# Patient Record
Sex: FEMALE | Race: WHITE | URBAN - METROPOLITAN AREA
[De-identification: names, ages, dates, MRNs, and addresses within clinical notes are randomized per-mention and may not be internally consistent; named-entity substitution may affect disease eponyms.]

---

## 2017-01-20 ENCOUNTER — COMMUNICATION - HEALTHEAST (OUTPATIENT)
Dept: ADMINISTRATIVE | Facility: CLINIC | Age: 42
End: 2017-01-20

## 2017-01-27 ENCOUNTER — COMMUNICATION - HEALTHEAST (OUTPATIENT)
Dept: ADMINISTRATIVE | Facility: CLINIC | Age: 42
End: 2017-01-27

## 2017-01-30 ENCOUNTER — COMMUNICATION - HEALTHEAST (OUTPATIENT)
Dept: ADMINISTRATIVE | Facility: CLINIC | Age: 42
End: 2017-01-30

## 2017-02-05 ENCOUNTER — HOSPITAL ENCOUNTER (EMERGENCY)
Facility: CLINIC | Age: 42
Discharge: ANOTHER HOSPITAL | End: 2017-02-05
Attending: EMERGENCY MEDICINE | Admitting: EMERGENCY MEDICINE
Payer: MEDICARE

## 2017-02-05 ENCOUNTER — RECORDS - HEALTHEAST (OUTPATIENT)
Dept: ADMINISTRATIVE | Facility: OTHER | Age: 42
End: 2017-02-05

## 2017-02-05 ENCOUNTER — HOSPITAL ENCOUNTER (INPATIENT)
Facility: CLINIC | Age: 42
LOS: 4 days | Discharge: HOME | DRG: 885 | End: 2017-02-09
Attending: PSYCHIATRY & NEUROLOGY | Admitting: PSYCHIATRY & NEUROLOGY
Payer: MEDICARE

## 2017-02-05 VITALS
DIASTOLIC BLOOD PRESSURE: 87 MMHG | OXYGEN SATURATION: 100 % | TEMPERATURE: 97.9 F | RESPIRATION RATE: 16 BRPM | SYSTOLIC BLOOD PRESSURE: 121 MMHG

## 2017-02-05 DIAGNOSIS — F31.2: ICD-10-CM

## 2017-02-05 DIAGNOSIS — F31.9 BIPOLAR 1 DISORDER (H): ICD-10-CM

## 2017-02-05 DIAGNOSIS — F31.9 BIPOLAR 1 DISORDER (H): Primary | ICD-10-CM

## 2017-02-05 LAB
ALBUMIN SERPL-MCNC: 3.9 G/DL (ref 3.4–5)
ALBUMIN UR-MCNC: NEGATIVE MG/DL
ALP SERPL-CCNC: 37 U/L (ref 40–150)
ALT SERPL W P-5'-P-CCNC: 18 U/L (ref 0–50)
AMPHETAMINES UR QL SCN: NORMAL
ANION GAP SERPL CALCULATED.3IONS-SCNC: 6 MMOL/L (ref 3–14)
APPEARANCE UR: ABNORMAL
AST SERPL W P-5'-P-CCNC: 12 U/L (ref 0–45)
BARBITURATES UR QL: NORMAL
BASOPHILS # BLD AUTO: 0 10E9/L (ref 0–0.2)
BASOPHILS NFR BLD AUTO: 0.7 %
BENZODIAZ UR QL: NORMAL
BILIRUB SERPL-MCNC: 0.4 MG/DL (ref 0.2–1.3)
BILIRUB UR QL STRIP: NEGATIVE
BUN SERPL-MCNC: 11 MG/DL (ref 7–30)
CALCIUM SERPL-MCNC: 10.1 MG/DL (ref 8.5–10.1)
CANNABINOIDS UR QL SCN: NORMAL
CHLORIDE SERPL-SCNC: 106 MMOL/L (ref 94–109)
CO2 SERPL-SCNC: 28 MMOL/L (ref 20–32)
COCAINE UR QL: NORMAL
COLOR UR AUTO: ABNORMAL
CREAT SERPL-MCNC: 0.83 MG/DL (ref 0.52–1.04)
DIFFERENTIAL METHOD BLD: ABNORMAL
EOSINOPHIL # BLD AUTO: 0.2 10E9/L (ref 0–0.7)
EOSINOPHIL NFR BLD AUTO: 3.1 %
ERYTHROCYTE [DISTWIDTH] IN BLOOD BY AUTOMATED COUNT: 12 % (ref 10–15)
GFR SERPL CREATININE-BSD FRML MDRD: 75 ML/MIN/1.7M2
GLUCOSE SERPL-MCNC: 91 MG/DL (ref 70–99)
GLUCOSE UR STRIP-MCNC: NEGATIVE MG/DL
HCG UR QL: NEGATIVE
HCT VFR BLD AUTO: 42.1 % (ref 35–47)
HGB BLD-MCNC: 14.1 G/DL (ref 11.7–15.7)
HGB UR QL STRIP: NEGATIVE
IMM GRANULOCYTES # BLD: 0 10E9/L (ref 0–0.4)
IMM GRANULOCYTES NFR BLD: 0.2 %
KETONES UR STRIP-MCNC: NEGATIVE MG/DL
LEUKOCYTE ESTERASE UR QL STRIP: NEGATIVE
LIPASE SERPL-CCNC: 295 U/L (ref 73–393)
LITHIUM SERPL-SCNC: 0.5 MMOL/L (ref 0.6–1.2)
LYMPHOCYTES # BLD AUTO: 1.6 10E9/L (ref 0.8–5.3)
LYMPHOCYTES NFR BLD AUTO: 26.3 %
MCH RBC QN AUTO: 31 PG (ref 26.5–33)
MCHC RBC AUTO-ENTMCNC: 33.5 G/DL (ref 31.5–36.5)
MCV RBC AUTO: 93 FL (ref 78–100)
MONOCYTES # BLD AUTO: 0.8 10E9/L (ref 0–1.3)
MONOCYTES NFR BLD AUTO: 12.7 %
MUCOUS THREADS #/AREA URNS LPF: PRESENT /LPF
NEUTROPHILS # BLD AUTO: 3.5 10E9/L (ref 1.6–8.3)
NEUTROPHILS NFR BLD AUTO: 57 %
NITRATE UR QL: NEGATIVE
OPIATES UR QL SCN: NORMAL
PCP UR QL SCN: NORMAL
PH UR STRIP: 7.5 PH (ref 5–7)
PLATELET # BLD AUTO: 144 10E9/L (ref 150–450)
POTASSIUM SERPL-SCNC: 3.6 MMOL/L (ref 3.4–5.3)
PROT SERPL-MCNC: 7.6 G/DL (ref 6.8–8.8)
RBC # BLD AUTO: 4.55 10E12/L (ref 3.8–5.2)
RBC #/AREA URNS AUTO: 0 /HPF (ref 0–2)
SODIUM SERPL-SCNC: 140 MMOL/L (ref 133–144)
SP GR UR STRIP: 1.01 (ref 1–1.03)
SQUAMOUS #/AREA URNS AUTO: <1 /HPF (ref 0–1)
TSH SERPL DL<=0.005 MIU/L-ACNC: 1.1 MU/L (ref 0.4–4)
URN SPEC COLLECT METH UR: ABNORMAL
UROBILINOGEN UR STRIP-MCNC: NORMAL MG/DL (ref 0–2)
WBC # BLD AUTO: 6.1 10E9/L (ref 4–11)
WBC #/AREA URNS AUTO: 1 /HPF (ref 0–2)

## 2017-02-05 PROCEDURE — 83690 ASSAY OF LIPASE: CPT | Performed by: EMERGENCY MEDICINE

## 2017-02-05 PROCEDURE — 80053 COMPREHEN METABOLIC PANEL: CPT | Performed by: EMERGENCY MEDICINE

## 2017-02-05 PROCEDURE — 25000132 ZZH RX MED GY IP 250 OP 250 PS 637: Mod: GY | Performed by: PSYCHIATRY & NEUROLOGY

## 2017-02-05 PROCEDURE — 90791 PSYCH DIAGNOSTIC EVALUATION: CPT

## 2017-02-05 PROCEDURE — 80178 ASSAY OF LITHIUM: CPT | Performed by: EMERGENCY MEDICINE

## 2017-02-05 PROCEDURE — A9270 NON-COVERED ITEM OR SERVICE: HCPCS | Mod: GY | Performed by: PSYCHIATRY & NEUROLOGY

## 2017-02-05 PROCEDURE — 36415 COLL VENOUS BLD VENIPUNCTURE: CPT | Performed by: PSYCHIATRY & NEUROLOGY

## 2017-02-05 PROCEDURE — 80307 DRUG TEST PRSMV CHEM ANLYZR: CPT | Performed by: EMERGENCY MEDICINE

## 2017-02-05 PROCEDURE — 81025 URINE PREGNANCY TEST: CPT | Performed by: EMERGENCY MEDICINE

## 2017-02-05 PROCEDURE — 84443 ASSAY THYROID STIM HORMONE: CPT | Performed by: PSYCHIATRY & NEUROLOGY

## 2017-02-05 PROCEDURE — 85025 COMPLETE CBC W/AUTO DIFF WBC: CPT | Performed by: EMERGENCY MEDICINE

## 2017-02-05 PROCEDURE — 99284 EMERGENCY DEPT VISIT MOD MDM: CPT | Performed by: EMERGENCY MEDICINE

## 2017-02-05 PROCEDURE — 99284 EMERGENCY DEPT VISIT MOD MDM: CPT | Mod: 25

## 2017-02-05 PROCEDURE — 12400006 ZZH R&B MH INTERMEDIATE

## 2017-02-05 PROCEDURE — 81001 URINALYSIS AUTO W/SCOPE: CPT | Performed by: EMERGENCY MEDICINE

## 2017-02-05 RX ORDER — DESOGESTREL AND ETHINYL ESTRADIOL 0.15-0.03
1 KIT ORAL DAILY
Status: DISCONTINUED | OUTPATIENT
Start: 2017-02-06 | End: 2017-02-09 | Stop reason: HOSPADM

## 2017-02-05 RX ORDER — DESOGESTREL AND ETHINYL ESTRADIOL 0.15-0.03
1 KIT ORAL DAILY
COMMUNITY

## 2017-02-05 RX ORDER — LITHIUM CARBONATE 300 MG/1
300 CAPSULE ORAL
Status: DISCONTINUED | OUTPATIENT
Start: 2017-02-05 | End: 2017-02-07

## 2017-02-05 RX ORDER — ARIPIPRAZOLE 30 MG/1
30 TABLET ORAL EVERY MORNING
Status: ON HOLD | COMMUNITY
End: 2017-02-08

## 2017-02-05 RX ORDER — QUETIAPINE FUMARATE 25 MG/1
25-50 TABLET, FILM COATED ORAL EVERY 6 HOURS PRN
Status: DISCONTINUED | OUTPATIENT
Start: 2017-02-05 | End: 2017-02-09 | Stop reason: HOSPADM

## 2017-02-05 RX ORDER — ARIPIPRAZOLE 15 MG/1
30 TABLET ORAL EVERY MORNING
Status: DISCONTINUED | OUTPATIENT
Start: 2017-02-06 | End: 2017-02-06

## 2017-02-05 RX ADMIN — LITHIUM CARBONATE 300 MG: 300 CAPSULE, GELATIN COATED ORAL at 18:07

## 2017-02-05 RX ADMIN — AMITRIPTYLINE HYDROCHLORIDE 25 MG: 25 TABLET, FILM COATED ORAL at 21:56

## 2017-02-05 ASSESSMENT — ENCOUNTER SYMPTOMS
NERVOUS/ANXIOUS: 1
SLEEP DISTURBANCE: 1
HALLUCINATIONS: 0

## 2017-02-05 ASSESSMENT — ACTIVITIES OF DAILY LIVING (ADL)
ORAL_HYGIENE: INDEPENDENT
LAUNDRY: WITH SUPERVISION
DRESS: SCRUBS (BEHAVIORAL HEALTH)
GROOMING: INDEPENDENT

## 2017-02-05 NOTE — ED NOTES
Report called to Martita @ [facility]. Waiting to hear from MD if EMS transfer or private car preferred. Pt aware of transfer. No questions asked at time of report by RN or pt

## 2017-02-05 NOTE — ED NOTES
Pt feeling stressed the past week.  Having increase in anxiety and manic symptoms.  Impulsive thinking, in ETOH recovery, doing things she shouldn't be doing.  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  Thinking about alcohol.  Pt taking her scheduled meds.  Did want to take med to help her sleep.  Brought meds with her. Pt feeling hopeless and depressed.  Worried about managing her job, feeling this way.  Denies feeling suicidal at this time.

## 2017-02-05 NOTE — ED NOTES
"Over last week patient has been experiencing insomnia (up 3 days straight), \"manic behaviors\": impulsive gambling, racing thoughts, and now per pt depression. States she has bipolar hx but only has experienced cristina, never a \"low/depressed\" feeling. Called psychologist who recommended pt come in if no relief in sx after last visit and med change. Pt making eye contact, vitals stable. States she has been sober x2 years, has had thoughts of getting a hotel room alcohol, but no plan for death or suicide- states \"I just want to run away and get some sleep\"   "

## 2017-02-05 NOTE — ED PROVIDER NOTES
History     Chief Complaint   Patient presents with     Manic Behavior     Pt feeling stressed the past week.  Having increase in anxiety and manic symptoms.  Impulsive thinking, in ETOH recovery, doing things she shouldn't be doing.  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  Thinking about alcohol.  Pt taking her scheduled meds.  Did want to take med to help her sleep.  Brought meds with her.      GELA Gale is a 41 year old female with a history of bipolar disorder presents to the Emergency Department for evaluation of mood swings. The patient reports over the last 1-2 weeks, she has been feeling very manic. She admits to impulsive and risky behavior like spending money. She notes although she has been sober for 2 years, she has been thinking about alcohol and wants to get a hotel room and get drunk. She notes during this time, she was away for three days straight. She has high levels of stress as she is working and going to college full time. In the last two days, the patient reports feelings of extreme depression. She reports feelings of failure, that she should quit school and her job, and hopelessness.     The patient was seen by her psychiatrist who increased her Abilify started the patient on Lithium about 1 week ago. She was told to go to the Emergency Department if her symptoms did not improve. She has had difficulty sleeping. The patient denies hallucinations and thoughts of self harm. She denies alcohol or drug use. The patient reports no recent illness which may have triggered her symptoms.     Patient Active Problem List   Diagnosis     TBI (traumatic brain injury) (H)     Current Outpatient Prescriptions   Medication Sig Dispense Refill     lithium 300 MG tablet Take 1 tablet (300 mg) by mouth 2 times daily 60 tablet 0     ARIPiprazole (ABILIFY) 5 MG tablet Take 1 tablet (5 mg) by mouth daily In addition to your 20 mg tab for a total of 25 mg daily 30 tablet 0     amitriptyline (ELAVIL) 25 MG  tablet Take 1 tablet (25 mg) by mouth At Bedtime 30 tablet 0     No Known Allergies    I have reviewed the Medications, Allergies, Past Medical and Surgical History, and Social History in the Epic system.    Review of Systems   Constitutional: Negative for activity change and appetite change.   HENT: Negative for congestion.    Respiratory: Negative for chest tightness and shortness of breath.    Cardiovascular: Negative for chest pain.   Gastrointestinal: Negative for nausea, vomiting and abdominal pain.   Genitourinary: Negative for dysuria and decreased urine volume.   Musculoskeletal: Negative for back pain and neck pain.   Skin: Negative for rash.   Neurological: Positive for headaches. Negative for weakness and numbness.   Psychiatric/Behavioral: Positive for behavioral problems and sleep disturbance. Negative for suicidal ideas, hallucinations and confusion. The patient is nervous/anxious.      Physical Exam   BP: 121/87 mmHg  Heart Rate: 6  Temp: 97.9  F (36.6  C)  Resp: 16  SpO2: 100 %  Physical Exam   Constitutional: She appears well-developed and well-nourished. No distress.   Nursing note and vitals reviewed.    HENT: Oral mucosa moist. No lesions.  Neck: Supple  Pulmonary/Chest: Lungs are clear to auscultation bilaterally.  Cardiovascular: Heart is regular rate and rhythm. No murmur.  Abdomen: Soft, non-distended, non-tender.   Musculoskeletal: Moving all extremities well. No peripheral edema.   Neurological: Alert. No focal neurologic deficit.   Skin: No rash.  Psych: Anxious. Firm grasp of the situation.     ED Course   Procedures             Critical Care time:  none              Labs Ordered and Resulted from Time of ED Arrival Up to the Time of Departure from the ED   CBC WITH PLATELETS DIFFERENTIAL - Abnormal; Notable for the following:     Platelet Count 144 (*)     All other components within normal limits   COMPREHENSIVE METABOLIC PANEL - Abnormal; Notable for the following:     Alkaline  Phosphatase 37 (*)     All other components within normal limits   URINE MACROSCOPIC WITH REFLEX TO MICRO - Abnormal; Notable for the following:     pH Urine 7.5 (*)     Mucous Urine Present (*)     All other components within normal limits   LITHIUM LEVEL - Abnormal; Notable for the following:     Lithium Level 0.5 (*)     All other components within normal limits   DRUG ABUSE SCREEN 77 URINE (FL, RH, SH)   LIPASE   HCG QUALITATIVE URINE     Results for orders placed or performed during the hospital encounter of 02/05/17 (from the past 24 hour(s))   Drug Screen Urine   Result Value Ref Range    Amphetamine Qual Urine  NEG     Negative   Cutoff for a negative amphetamine is 500 ng/mL or less.      Barbiturates Qual Urine  NEG     Negative   Cutoff for a negative barbiturate is 200 ng/mL or less.      Benzodiazepine Qual Urine  NEG     Negative   Cutoff for a negative benzodiazepine is 200 ng/mL or less.      Cannabinoids Qual Urine  NEG     Negative   Cutoff for a negative cannabinoid is 50 ng/mL or less.      Cocaine Qual Urine  NEG     Negative   Cutoff for a negative cocaine is 300 ng/mL or less.      Opiates Qualitative Urine  NEG     Negative   Cutoff for a negative opiate is 300 ng/mL or less.      PCP Qual Urine  NEG     Negative   Cutoff for a negative PCP is 25 ng/mL or less.         Medications - No data to display    11:48 AM Patient assessed.     Assessments & Plan (with Medical Decision Making)labs and a DEC consult were obtained. Patients white count was not elevated. There was no L shift. Comprehensive metabolic panel was unremarkable. Lithium level was low. Drug screen was unremarkable. DEC consultant felt patient needed to be admitted due to her worsening cristina and possible harm to herself. I discussed this with the patient and she was in agreement with the admission. All questions were answered to the best of my ability. I did place her on a transportation hold. She was transported to [facility] in  Stable condition.     I have reviewed the nursing notes.    I have reviewed the findings, diagnosis, plan and need for follow up with the patient.    New Prescriptions    No medications on file       Final diagnoses:   Bipolar 1 disorder (H)   Manic disorder, recurrent episode, severe, with psychotic behavior (H)     This document serves as a record of the services and decisions personally performed and made by Harsha Phillips MD. It was created on his behalf by Mirian Guzmán, a trained medical scribe. The creation of this document is based the provider's statements to the medical scribe.  Mirian Guzmán 11:48 AM 2/5/2017    Provider:   The information in this document, created by the medical scribe for me, accurately reflects the services I personally performed and the decisions made by me. I have reviewed and approved this document for accuracy prior to leaving the patient care area.  Harsha Phillips MD 11:48 AM 2/5/2017 2/5/2017   [facility] EMERGENCY DEPARTMENT      Harsha Phillips MD  02/06/17 1135

## 2017-02-05 NOTE — IP AVS SNAPSHOT
[place] [address] [place] [address]    Phone:  [phone]                                       After Visit Summary   2/5/2017    Deb Gale    MRN: [id number]           After Visit Summary Signature Page     I have received my discharge instructions, and my questions have been answered. I have discussed any challenges I see with this plan with the nurse or doctor.    ..........................................................................................................................................  Patient/Patient Representative Signature      ..........................................................................................................................................  Patient Representative Print Name and Relationship to Patient    ..................................................               ................................................  Date                                            Time    ..........................................................................................................................................  Reviewed by Signature/Title    ...................................................              ..............................................  Date                                                            Time

## 2017-02-05 NOTE — PROGRESS NOTES
02/05/17 1644   Patient Belongings   Did you bring any home meds/supplements to the hospital?  Yes   Disposition of meds  Other (see comment)   Patient Belongings other (see comments)   Disposition of Belongings Pt's belongings were put in Pt's locker   Belongings Search Yes   Clothing Search Yes   Second Staff Nehemias   Black winter coat  Boots  Jeans  Belt  Sweater  2 X socks  Two ear rings  Lighter  Grey purse  Backpack   Panties  Dark grey long sleave shirt  Sweat pants  1 tums tablet  3 packs of cigerattes  [employer] insurance  [place] ID card  $16  Candy  Loose change  One josé luis cube  Sun glasses  Makeup  Cell phone   Lithium Carbonate 300(mg) bottle  Amitriptyline 25(mg)    Security envelope #[id number]   [facility]'s #01  [place] card  Visa #[id number]   justo #[id number]  Master card #[id number]      Admission_________________________________________Date/Time__________      Discharge_________________________________________Date/Time__________
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
## 2017-02-05 NOTE — IP AVS SNAPSHOT
MRN:[id number]                      After Visit Summary   2/5/2017    Deb Gale    MRN: [id number]           Thank you!     Thank you for choosing [facility] for your care. Our goal is always to provide you with excellent care.        Patient Information     Date Of Birth          1975        About your hospital stay     You were admitted on:  February 5, 2017 You last received care in the:  [facility]    You were discharged on:  February 9, 2017       Who to Call     For medical emergencies, please call 911.  For non-urgent questions about your medical care, please call your primary care provider or clinic, [phone]          Attending Provider     Provider    Rickey Esquivel MD [place], Olukayode Oladele, MD       Primary Care Provider Office Phone # Fax #    Jory Jose Buchanan [phone]       [place] [id number] [facility] [address]        Further instructions from your care team       Behavioral Discharge Planning and Instructions    Summary: Admitted to hospital with manic behavior; evaluation of mood swings.    Main Diagnosis: Bipolar 1 disorder; alcohol use disorder in remission.     Major Treatments, Procedures and Findings: psychiatric evaluation.    Symptoms to Report: increased confusion, losing more sleep or mood getting worse    Lifestyle Adjustment: Continue sober lifestyle with AA and NA support. Develop and follow safety plan. Follow up with therapy and medication management.    Psychiatry Follow-up:     Felisa Hernandez, therapist- appointments on Friday 2/10/17 at 10 am and Monday 2/13/17 at 11 am.  [facility] [address] [address]  [phone] fax: [phone]    SUZANNE Soria- appointment on Friday 2/24/17 at 4 pm.  [place] [address] [address] [place] [address]  [phone] fax: [phone]    Resources:   Crisis Intervention: 980.877.9010 or 530-974-8622  "(TTY: 608.904.8841).  Call anytime for help.  National [place] on Mental Illness (www.[place].khoa.org): 992.948.3002 or 010-857-9018.  Alcoholics Anonymous (www.alcoholics-anonymous.org): Check your phone book for your local chapter.  National Suicide Prevention Line (www.mentalhealthmn.org): 346-091-IPPA (5188)    General Medication Instructions:   See your medication sheet(s) for instructions.   Take all medicines as directed.  Make no changes unless your doctor suggests them.   Go to all your doctor visits.  Be sure to have all your required lab tests. This way, your medicines can be refilled on time.  Do not use any drugs not prescribed by your doctor.  Avoid alcohol.        Pending Results     No orders found from 2/4/2017 to 2/6/2017.            Statement of Approval     Ordered          02/09/17 0856  I have reviewed and agree with all the recommendations and orders detailed in this document.   EFFECTIVE NOW     Approved and electronically signed by:  Beck Lai MD             Admission Information        Provider Department Dept Phone    2/5/2017 Beck Lai MD [facility] [phone]      Your Vitals Were     Blood Pressure Pulse Temperature Respirations Height Weight    114/75 mmHg 79 98  F (36.7  C) (Oral) 16 1.753 m (5' 9\") 77.111 kg (170 lb)    BMI (Body Mass Index)                   25.09 kg/m2           MyChart Information     [email or web address] lets you send messages to your doctor, view your test results, renew your prescriptions, schedule appointments and more. To sign up, go to [email or web address] . Click on \"Log in\" on the left side of the screen, which will take you to the Welcome page. Then click on \"Sign up Now\" on the right side of the page.     You will be asked to enter the access code listed below, as well as some personal information. Please follow the directions to create your username and password.     Your access code is: [email or web address]  Expires: 5/6/2017  " 1:58 PM     Your access code will  in 90 days. If you need help or a new code, please call your [facility] clinic or [phone].        Care EveryWhere ID     This is your Care EveryWhere ID. This could be used by other organizations to access your [facility] medical records  [id number]           Review of your medicines      START taking        Dose / Directions    lithium 450 MG CR tablet   Commonly known as:  ESKALITH   Used for:  Bipolar 1 disorder (H)   Replaces:  lithium 300 MG tablet        Dose:  450 mg   Take 1 tablet (450 mg) by mouth 2 times daily   Quantity:  60 tablet   Refills:  0       lurasidone 40 MG Tabs tablet   Commonly known as:  LATUDA   Used for:  Bipolar 1 disorder (H)        Dose:  40 mg   Take 1 tablet (40 mg) by mouth daily (with dinner)   Quantity:  30 tablet   Refills:  0         CONTINUE these medicines which have NOT CHANGED        Dose / Directions    amitriptyline 25 MG tablet   Commonly known as:  ELAVIL        Dose:  25 mg   Take 1 tablet (25 mg) by mouth At Bedtime   Quantity:  30 tablet   Refills:  0       desogestrel-ethinyl estradiol 0.15-30 MG-MCG per tablet   Commonly known as:  APRI        Dose:  1 tablet   Take 1 tablet by mouth daily   Refills:  0       NICOTINE POLACRILEX MT        Dose:  4 mg   Take 4 mg by mouth every hour as needed for smoking cessation   Refills:  0         STOP taking     ABILIFY 30 MG tablet   Generic drug:  ARIPiprazole           lithium 300 MG tablet   Replaced by:  lithium 450 MG CR tablet                Where to get your medicines      These medications were sent to [facility] - [address]  [address] [facility] [address], [place] [address]     Phone:  [phone]    - lithium 450 MG CR tablet  - lurasidone 40 MG Tabs tablet             Protect others around you: Learn how to safely use, store and throw away your medicines at www.disposemymeds.org.             Medication List: This is a list of all your  medications and when to take them. Check marks below indicate your daily home schedule. Keep this list as a reference.      Medications           Morning Afternoon Evening Bedtime As Needed    amitriptyline 25 MG tablet   Commonly known as:  ELAVIL   Take 1 tablet (25 mg) by mouth At Bedtime   Last time this was given:  25 mg on 2/8/2017  8:52 PM                                desogestrel-ethinyl estradiol 0.15-30 MG-MCG per tablet   Commonly known as:  APRI   Take 1 tablet by mouth daily                                lithium 450 MG CR tablet   Commonly known as:  ESKALITH   Take 1 tablet (450 mg) by mouth 2 times daily   Last time this was given:  450 mg on 2/9/2017  8:45 AM                                lurasidone 40 MG Tabs tablet   Commonly known as:  LATUDA   Take 1 tablet (40 mg) by mouth daily (with dinner)   Last time this was given:  40 mg on 2/8/2017  5:24 PM                                NICOTINE POLACRILEX MT   Take 4 mg by mouth every hour as needed for smoking cessation   Last time this was given:  4 mg on 2/9/2017  6:50 AM

## 2017-02-06 ENCOUNTER — RECORDS - HEALTHEAST (OUTPATIENT)
Dept: ADMINISTRATIVE | Facility: OTHER | Age: 42
End: 2017-02-06

## 2017-02-06 PROCEDURE — 25000132 ZZH RX MED GY IP 250 OP 250 PS 637: Mod: GY | Performed by: PSYCHIATRY & NEUROLOGY

## 2017-02-06 PROCEDURE — 90853 GROUP PSYCHOTHERAPY: CPT

## 2017-02-06 PROCEDURE — 12400006 ZZH R&B MH INTERMEDIATE

## 2017-02-06 PROCEDURE — 97150 GROUP THERAPEUTIC PROCEDURES: CPT | Mod: GO

## 2017-02-06 PROCEDURE — A9270 NON-COVERED ITEM OR SERVICE: HCPCS | Mod: GY | Performed by: PSYCHIATRY & NEUROLOGY

## 2017-02-06 RX ORDER — LURASIDONE HYDROCHLORIDE 40 MG/1
40 TABLET, FILM COATED ORAL
Status: DISCONTINUED | OUTPATIENT
Start: 2017-02-06 | End: 2017-02-09 | Stop reason: HOSPADM

## 2017-02-06 RX ADMIN — NICOTINE POLACRILEX 4 MG: 2 GUM, CHEWING ORAL at 09:43

## 2017-02-06 RX ADMIN — AMITRIPTYLINE HYDROCHLORIDE 25 MG: 25 TABLET, FILM COATED ORAL at 21:10

## 2017-02-06 RX ADMIN — LITHIUM CARBONATE 300 MG: 300 CAPSULE, GELATIN COATED ORAL at 16:03

## 2017-02-06 RX ADMIN — LURASIDONE HYDROCHLORIDE 40 MG: 40 TABLET, FILM COATED ORAL at 17:29

## 2017-02-06 RX ADMIN — ARIPIPRAZOLE 30 MG: 15 TABLET ORAL at 08:37

## 2017-02-06 RX ADMIN — NICOTINE POLACRILEX 4 MG: 2 GUM, CHEWING ORAL at 14:36

## 2017-02-06 RX ADMIN — LITHIUM CARBONATE 300 MG: 300 CAPSULE, GELATIN COATED ORAL at 08:37

## 2017-02-06 ASSESSMENT — ENCOUNTER SYMPTOMS
NUMBNESS: 0
APPETITE CHANGE: 0
BACK PAIN: 0
NAUSEA: 0
CHEST TIGHTNESS: 0
SHORTNESS OF BREATH: 0
HEADACHES: 1
CONFUSION: 0
NECK PAIN: 0
DYSURIA: 0
ACTIVITY CHANGE: 0
ABDOMINAL PAIN: 0
WEAKNESS: 0
VOMITING: 0

## 2017-02-06 NOTE — PLAN OF CARE
Problem: Individualization  Goal: Patient Preferences  Outcome: Improving  Present in dayroom and on SDU. Pleasant and appropriate with staff and peers. Brightens on approach. Excited to move to SDU.

## 2017-02-06 NOTE — PROGRESS NOTES
Chart check    Chart reviewed.  Vitals stable and labs unremarkable.  Attempted to see patient today but they were with other providers.  Will plan to see patient tomorrow morning for routine H&P.    Isacc Suggs PA-C
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
## 2017-02-06 NOTE — PLAN OF CARE
"Problem: Depressive Symptoms  Goal: Depressive Symptoms  Signs and symptoms of listed problems will be absent or manageable.   Outcome: Improving  Out in lounge after intake . Pleasant , friendly, social and conversational. Mood appeared stable and behavior appropiate,. Blunt affect and brightens on approach. Stated \" I am so glad to be here. I have been stressed and depressed and felt like getting a bottle and drinking. I am so glad I did not. \" . Bed rested 2 hours , then out in lounge and social again before going to sleep for the night.         "

## 2017-02-06 NOTE — PLAN OF CARE
Problem: General Rehab Plan of Care  Goal: Occupational Therapy Goals  The patient and/or their representative will achieve their patient-specific goals related to the plan of care.  The patient-specific goals include:  INITIAL O.T. ASSESSMENT   Details:  Pt participated in OT group today. Affect was bright during interactions. Pt changed her mind several times before settling on an activity. She initiated a simple plan independently. Decision making appeared a bit impulsive. With minimal assistance, pt was able to engage in a more complex activity. Attention was good during activity performance. Speech during casual conversation was articulate and clear. Pt tracked conversation easily.

## 2017-02-07 ENCOUNTER — RECORDS - HEALTHEAST (OUTPATIENT)
Dept: ADMINISTRATIVE | Facility: OTHER | Age: 42
End: 2017-02-07

## 2017-02-07 PROCEDURE — 97150 GROUP THERAPEUTIC PROCEDURES: CPT | Mod: GO

## 2017-02-07 PROCEDURE — A9270 NON-COVERED ITEM OR SERVICE: HCPCS | Mod: GY | Performed by: PSYCHIATRY & NEUROLOGY

## 2017-02-07 PROCEDURE — 25000132 ZZH RX MED GY IP 250 OP 250 PS 637: Mod: GY | Performed by: PSYCHIATRY & NEUROLOGY

## 2017-02-07 PROCEDURE — 99222 1ST HOSP IP/OBS MODERATE 55: CPT | Mod: AI | Performed by: PHYSICIAN ASSISTANT

## 2017-02-07 PROCEDURE — 90853 GROUP PSYCHOTHERAPY: CPT

## 2017-02-07 PROCEDURE — 12400006 ZZH R&B MH INTERMEDIATE

## 2017-02-07 RX ADMIN — NICOTINE POLACRILEX 4 MG: 2 GUM, CHEWING ORAL at 17:42

## 2017-02-07 RX ADMIN — LITHIUM CARBONATE 300 MG: 300 CAPSULE, GELATIN COATED ORAL at 07:49

## 2017-02-07 RX ADMIN — LITHIUM CARBONATE 450 MG: 300 CAPSULE, GELATIN COATED ORAL at 15:37

## 2017-02-07 RX ADMIN — AMITRIPTYLINE HYDROCHLORIDE 25 MG: 25 TABLET, FILM COATED ORAL at 20:55

## 2017-02-07 RX ADMIN — NICOTINE POLACRILEX 4 MG: 2 GUM, CHEWING ORAL at 10:02

## 2017-02-07 RX ADMIN — NICOTINE POLACRILEX 4 MG: 2 GUM, CHEWING ORAL at 12:29

## 2017-02-07 RX ADMIN — LURASIDONE HYDROCHLORIDE 40 MG: 40 TABLET, FILM COATED ORAL at 17:19

## 2017-02-07 NOTE — PLAN OF CARE
Problem: Depressive Symptoms  Goal: Depressive Symptoms  Signs and symptoms of listed problems will be absent or manageable.   Outcome: Improving  Pleasant, cooperative, and social.  Acclimating well to SDU.  Pt was initially concerned about her medication changes, after discussing the changes though she was very pleased and hopeful that this med regime will be effective.  States that her thinking has improved in the sense that she has less racing thoughts and is able to sit through a group.  Still not at baseline though. Reports sleeping well last night, says she slept from 2115 to 0300, then was able to sleep off and on afterwards. Appears bright in affect and anxious.
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
## 2017-02-07 NOTE — PLAN OF CARE
Problem: Depressive Symptoms  Goal: Depressive Symptoms  Signs and symptoms of listed problems will be absent or manageable.   Pt attended groups and participated well. Said she is relieved to finally get her meds sorted out. She has been seeing a nurse practitioner for many years. She feels confident that the nurse practitioner won't make any changes to her medications. Encouraged Pt to consult a psychiatrist before making any med changes in the future. Pt could tell that she was becoming manic, but not until she had already made some bad decisions. Said she is diligent about taking her medications and has a good support system. Pt also said her sleep is much improved. Mood appears stable, affect brightens in conversation. Pt is hopeful for her future.

## 2017-02-07 NOTE — PROGRESS NOTES
"[facility] Psychiatric Progress Note      Interval History:   Pt seen, team meeting held with , nursing staff, OT, and PA's to review diagnosis and treatment plan.  Staff report the patient is doing better.  Patient reports she slept throughout the night which was a relief for her.  She reports that she is so happy she came to the hospital particularly because she did not relapse on alcohol.  She reports that her boyfriend researched her medications and they had that Abilify was probably responsible for presentation.  She was advised that Abilify does have some potential to cause compulsive behaviors.  She was told that \"The FDA is warning the public that compulsive or uncontrollable urges to gill, binge eat, shop, and have sex have been reported with the use of the antipsychotic drug aripiprazole (Abilify, Abilify Maintena, Aristada, and generics). The uncontrollable urges were reported to have stopped when the medicine was discontinued or the dose was reduced. These impulse-control problems are rare, but they can affect anyone who is taking the medicine, and they may result in harm to the patient and others if not recognized. As a result, the FDA is adding new warnings about all of these compulsive behaviors to the drug labels and patient Medication Guides for all aripiprazole products.\" She denies self-harm thoughts plans or intent.  She denies experiencing any side effects from Latuda.     Review of systems:   The Review of Systems is negative other than noted in the HPI     Medications:       lurasidone  40 mg Oral Daily with supper     amitriptyline  25 mg Oral At Bedtime     desogestrel-ethinyl estradiol  1 tablet Oral Daily     lithium  300 mg Oral BID     nicotine polacrilex, QUEtiapine    Mental Status Examination:     Appearance:  awake, alert, adequately groomed and casually dressed  Eye Contact:  better  Speech:  clear, coherent  Psychomotor Behavior:  no evidence of " tardive dyskinesia, dystonia, or tics and fidgeting  Mood:  better   Affect:  appropriate and in normal range and intensity is normal  Thought Process:  logical, linear and goal oriented no loose associations  Thought Content:  no evidence of suicidal ideation or homicidal ideation   Oriented to:  time, person, and place  Attention Span and Concentration:  limited  Recent and Remote Memory:  limited  Fund of Knowledge: appropriate  Muscle Strength and Tone: normal  Gait and Station: Normal  Insight:  fair  Judgment:  limited          Labs/Vitals:   No results found for this or any previous visit (from the past 24 hour(s)).  B/P: 108/66, T: 99.2, P: 85, R: 16    Impression:   This is a 41-year-old woman with known history of bipolar disorder and alcohol use disorder who presents to the hospital on account of worsening manic symptoms.  She had not been sleeping in the last week and was having impulsive thoughts of relapsing on alcohol or having sexual relations with strangers.  She felt unsafe and requested help in the hospital.  Attempts were made to reach her psychiatric provider, SUZANNE Dumont at [facility] per her request, but she was unavailable.            DIagnoses:     1.  Bipolar 1 disorder, current episode manic without psychotic features.    2.  Alcohol use disorder in remission.    3.  Borderline personality disorder by history.    4.  Traumatic brain injury (sequelae).           Plan:   1. Written information given on medications. Side effects, risks, benefits reviewed.  2.  Maintain current medications but increase lithium to 450 mg b.i.d.   3.  Continue hospitalization.      Attestation:  Patient has been seen and evaluated by Beck tobar MD    PATIENT ID  Name: Deb Gale  MRN:[id number]  YOB: 1975

## 2017-02-08 ENCOUNTER — RECORDS - HEALTHEAST (OUTPATIENT)
Dept: ADMINISTRATIVE | Facility: OTHER | Age: 42
End: 2017-02-08

## 2017-02-08 PROCEDURE — 25000132 ZZH RX MED GY IP 250 OP 250 PS 637: Mod: GY | Performed by: PSYCHIATRY & NEUROLOGY

## 2017-02-08 PROCEDURE — A9270 NON-COVERED ITEM OR SERVICE: HCPCS | Mod: GY | Performed by: PSYCHIATRY & NEUROLOGY

## 2017-02-08 PROCEDURE — 90853 GROUP PSYCHOTHERAPY: CPT

## 2017-02-08 PROCEDURE — 12400006 ZZH R&B MH INTERMEDIATE

## 2017-02-08 PROCEDURE — 90791 PSYCH DIAGNOSTIC EVALUATION: CPT

## 2017-02-08 PROCEDURE — 97150 GROUP THERAPEUTIC PROCEDURES: CPT | Mod: GO

## 2017-02-08 RX ORDER — LITHIUM CARBONATE 450 MG
450 TABLET, EXTENDED RELEASE ORAL EVERY 12 HOURS SCHEDULED
Status: DISCONTINUED | OUTPATIENT
Start: 2017-02-08 | End: 2017-02-09 | Stop reason: HOSPADM

## 2017-02-08 RX ORDER — LITHIUM CARBONATE 450 MG
450 TABLET, EXTENDED RELEASE ORAL 2 TIMES DAILY
Qty: 60 TABLET | Refills: 0 | Status: SHIPPED | OUTPATIENT
Start: 2017-02-08 | End: 2017-03-10

## 2017-02-08 RX ORDER — LURASIDONE HYDROCHLORIDE 40 MG/1
40 TABLET, FILM COATED ORAL
Qty: 30 TABLET | Refills: 0 | Status: SHIPPED | OUTPATIENT
Start: 2017-02-08 | End: 2017-03-10

## 2017-02-08 RX ADMIN — LURASIDONE HYDROCHLORIDE 40 MG: 40 TABLET, FILM COATED ORAL at 17:24

## 2017-02-08 RX ADMIN — LITHIUM CARBONATE 450 MG: 450 TABLET, EXTENDED RELEASE ORAL at 19:42

## 2017-02-08 RX ADMIN — AMITRIPTYLINE HYDROCHLORIDE 25 MG: 25 TABLET, FILM COATED ORAL at 20:52

## 2017-02-08 RX ADMIN — NICOTINE POLACRILEX 4 MG: 2 GUM, CHEWING ORAL at 13:34

## 2017-02-08 RX ADMIN — NICOTINE POLACRILEX 4 MG: 2 GUM, CHEWING ORAL at 09:23

## 2017-02-08 RX ADMIN — LITHIUM CARBONATE 450 MG: 300 CAPSULE, GELATIN COATED ORAL at 08:14

## 2017-02-08 NOTE — PROGRESS NOTES
[facility] Psychiatric Progress Note      Interval History:   Pt seen, team meeting held with , nursing staff, OT, and PA's to review diagnosis and treatment plan.  Staff report the patient is doing better.  Patient reports she slept all night and feels her mood is much better. She denies self harm thoughts or plans. Tolerating medications well without significant side effects.      Review of systems:   The Review of Systems is negative other than noted in the HPI     Medications:       lithium  450 mg Oral BID     lurasidone  40 mg Oral Daily with supper     amitriptyline  25 mg Oral At Bedtime     desogestrel-ethinyl estradiol  1 tablet Oral Daily     nicotine polacrilex, QUEtiapine    Mental Status Examination:     Appearance:  awake, alert, adequately groomed and casually dressed  Eye Contact:  better  Speech:  clear, coherent  Psychomotor Behavior:  no evidence of tardive dyskinesia, dystonia, or tics and fidgeting  Mood:  better   Affect:  appropriate and in normal range and intensity is normal  Thought Process:  logical, linear and goal oriented no loose associations  Thought Content:  no evidence of suicidal ideation or homicidal ideation   Oriented to:  time, person, and place  Attention Span and Concentration:  limited  Recent and Remote Memory:  limited  Fund of Knowledge: appropriate  Muscle Strength and Tone: normal  Gait and Station: Normal  Insight:  fair  Judgment:  limited          Labs/Vitals:   No results found for this or any previous visit (from the past 24 hour(s)).  B/P: 108/66, T: 99.2, P: 85, R: 16    Impression:   This is a 41-year-old woman with known history of bipolar disorder and alcohol use disorder who presents to the hospital on account of worsening manic symptoms.  She had not been sleeping in the last week and was having impulsive thoughts of relapsing on alcohol or having sexual relations with strangers.  She felt unsafe and requested help in the  [facility]ospitals in [place].  Attempts were made to reach her psychiatric provider, SUZANNE Dumont at [facility] per her request, but she was unavailable.            DIagnoses:     1.  Bipolar 1 disorder, current episode manic without psychotic features.    2.  Alcohol use disorder in remission.    3.  Borderline personality disorder by history.    4.  Traumatic brain injury (sequelae).           Plan:   1. Written information given on medications. Side effects, risks, benefits reviewed.  2. Check lithium level in the morning.  3. Continue hospitalization.      Attestation:  Patient has been seen and evaluated by Bekc tobar MD    PATIENT ID  Name: Deb Gale  MRN:[id number]  YOB: 1975

## 2017-02-08 NOTE — PLAN OF CARE
Problem: Depressive Symptoms  Goal: Depressive Symptoms  Signs and symptoms of listed problems will be absent or manageable.   Outcome: No Change  Pt has been present on the unit through the majority of the shift. Pt has been attending groups and appreciating appropriately. Pt shows good insight into her illness and is proud that she didn't drink. Pt is working on her DC plan and is hoping her medications will be effective. Pt is pleasant and cooperative.
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
## 2017-02-08 NOTE — PLAN OF CARE
"Problem: Depressive Symptoms  Goal: Depressive Symptoms  Signs and symptoms of listed problems will be absent or manageable.   Outcome: Improving  Pleasant and cooperative. Pt states she is, \"Much better.\" She remains very hopeful that Latuda will help her.  Reports sleeping much better. Slept for 9 hours last night. Says the impulsive urges have subsided.  She was slightly disappointed Lithium was increased but hopes it helps.  She hopes she will D/C by the weekend.   Appears bright in affect.        "

## 2017-02-08 NOTE — DISCHARGE INSTRUCTIONS
Behavioral Discharge Planning and Instructions    Summary: Admitted to hospital with manic behavior; evaluation of mood swings.    Main Diagnosis: Bipolar 1 disorder; alcohol use disorder in remission.     Major Treatments, Procedures and Findings: psychiatric evaluation.    Symptoms to Report: increased confusion, losing more sleep or mood getting worse    Lifestyle Adjustment: Continue sober lifestyle with AA and NA support. Develop and follow safety plan. Follow up with therapy and medication management.    Psychiatry Follow-up:     Felisa Hernandez, therapist- appointments on Friday 2/10/17 at 10 am and Monday 2/13/17 at 11 am.  [facility]  [address] [place] [id number]  [phone] fax: [phone]    Arcelia Cordon CNS- appointment on Friday 2/24/17 at 4 pm.  [address] [place] [address]  [phone] fax: [phone]    Resources:   Crisis Intervention: 276.523.2603 or 634-183-1425 (TTY: 223.844.8353).  Call anytime for help.  National Union on Mental Illness (www.mn.khoa.org): 473.731.2734 or 230-720-9472.  Alcoholics Anonymous (www.alcoholics-anonymous.org): Check your phone book for your local chapter.  National Suicide Prevention Line (www.mentalhealthmn.org): 237-359-IKAW (6297)    General Medication Instructions:   See your medication sheet(s) for instructions.   Take all medicines as directed.  Make no changes unless your doctor suggests them.   Go to all your doctor visits.  Be sure to have all your required lab tests. This way, your medicines can be refilled on time.  Do not use any drugs not prescribed by your doctor.  Avoid alcohol.

## 2017-02-09 ENCOUNTER — RECORDS - HEALTHEAST (OUTPATIENT)
Dept: ADMINISTRATIVE | Facility: OTHER | Age: 42
End: 2017-02-09

## 2017-02-09 ENCOUNTER — COMMUNICATION - HEALTHEAST (OUTPATIENT)
Dept: FAMILY MEDICINE | Facility: CLINIC | Age: 42
End: 2017-02-09

## 2017-02-09 VITALS
SYSTOLIC BLOOD PRESSURE: 114 MMHG | WEIGHT: 170 LBS | RESPIRATION RATE: 16 BRPM | HEIGHT: 69 IN | DIASTOLIC BLOOD PRESSURE: 75 MMHG | TEMPERATURE: 98 F | BODY MASS INDEX: 25.18 KG/M2 | HEART RATE: 79 BPM

## 2017-02-09 DIAGNOSIS — F31.9 BIPOLAR 1 DISORDER (H): ICD-10-CM

## 2017-02-09 LAB — LITHIUM SERPL-SCNC: 0.5 MMOL/L (ref 0.6–1.2)

## 2017-02-09 PROCEDURE — 90853 GROUP PSYCHOTHERAPY: CPT

## 2017-02-09 PROCEDURE — 36415 COLL VENOUS BLD VENIPUNCTURE: CPT | Performed by: PSYCHIATRY & NEUROLOGY

## 2017-02-09 PROCEDURE — 80178 ASSAY OF LITHIUM: CPT | Performed by: PSYCHIATRY & NEUROLOGY

## 2017-02-09 PROCEDURE — 25000132 ZZH RX MED GY IP 250 OP 250 PS 637: Mod: GY | Performed by: PSYCHIATRY & NEUROLOGY

## 2017-02-09 PROCEDURE — A9270 NON-COVERED ITEM OR SERVICE: HCPCS | Mod: GY | Performed by: PSYCHIATRY & NEUROLOGY

## 2017-02-09 RX ADMIN — NICOTINE POLACRILEX 4 MG: 2 GUM, CHEWING ORAL at 06:50

## 2017-02-09 RX ADMIN — LITHIUM CARBONATE 450 MG: 450 TABLET, EXTENDED RELEASE ORAL at 08:45

## 2017-02-09 ASSESSMENT — ACTIVITIES OF DAILY LIVING (ADL)
ORAL_HYGIENE: INDEPENDENT
DRESS: STREET CLOTHES
LAUNDRY: WITH SUPERVISION
GROOMING: INDEPENDENT

## 2017-02-09 NOTE — PROGRESS NOTES
Pt came to the hospital with a bottle of Lithium Carbonate 300mg, which was documented on the pt belongings form.  When pt D/C'd she declined taking this medication with her and asked hospital staff to dispose of this.  Charge RN called pharmacy to dispose of this medication.

## 2017-02-09 NOTE — PROGRESS NOTES
Patient discharged denies suicidal ideation and has stable mood. Instructions gone over with patient regarding medications and follow up plan.  . Copies of discharge instructions ( After Visit Summary) given to patient.Pt discharged with 30 day supply of medications. Boyfriend here to pick her up and is very supportive

## 2017-02-09 NOTE — PLAN OF CARE
Problem: Depressive Symptoms  Goal: Depressive Symptoms  Signs and symptoms of listed problems will be absent or manageable.   Outcome: Improving  Pleasant and cooperative.  Enjoyed her visit with her boyfriend.  She is looking forward to D/Cing tomorrow.  She says that she is feeling much better, more stable in mood, and feels ready to go.  Her medications are in her locker.  She does have a lithium level draw in the morning on 2/9.

## 2017-02-13 ENCOUNTER — COMMUNICATION - HEALTHEAST (OUTPATIENT)
Dept: FAMILY MEDICINE | Facility: CLINIC | Age: 42
End: 2017-02-13

## 2017-02-20 NOTE — DISCHARGE SUMMARY
"DATE OF ADMISSION:  02/05/2017   DATE OF DISCHARGE:  02/09/2017      DISCHARGE DIAGNOSES:   1.  Bipolar I disorder, current episode manic without psychotic features.   2.  Alcohol use disorder in remission.   3.  Borderline personality disorder by history.   4.  Traumatic brain injury by history.      REASON FOR REFERRAL:  Deb Gale is a 41-year-old woman who reports she is single with no children.  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  She currently sees SUZANNE Dumont at [facility] and also has a therapist, Zunilda Hernandez.  She presented to [facility] Emergency Department on account of feeling stressed the past week with associated increased anxiety and manic symptoms.  She admitted to having thoughts of wanting to relapse on alcohol.  She is currently in recovery.  She was admitted voluntarily.      CHIEF COMPLAINT:  \"I have not been doing well in the last 3 weeks and my doctor has been adjusting my medications, but I think it made me worse.\"      HISTORY OF PRESENT ILLNESS:  I refer the reader to the psychiatric evaluation documented by Beck Lai MD on 02/06/2017 in addition to the history and physical examination completed by Isacc Suggs PA-C and attested by Rufus Qureshi MD on 02/07/2017.  I also refer to the case management psychosocial assessment completed by Avis Blunt  on 02/08/2017.      HOSPITAL COURSE:  Following admission to the mental health unit, the patient was moved down to the step-down unit and introduced to the milieu.  She was given a tour of the unit and given the opportunity to ventilate her stressors.  Following review of her history, it became apparent that she had developed increased compulsive behavior after the dose of her Abilify was raised.  She had compulsively spent more money than she had previously and she had also been impulsively doing things that were out of character for her.  " She had thoughts of having sexual relations with random people and was struggling with thoughts of relapsing on alcohol which were something that had devastated her life in the past resulting in her going to [facility] for a total of 9 years on account of DWI-related felony.  She had also been started on lithium recently and on account of her presentation she was taken off Abilify following explanation of the potential risk of becoming compulsive on the medication to which she consented having been advised by her boyfriend that he had read about the medication and was convinced that it was responsible for her behavior.  She was offered Latuda in its place while maintaining lithium which was ultimately titrated to 450 mg b.i.d. of the extended release formulation.  Latuda was started at 20 mg daily and titrated up to 40 mg daily.  The patient did very well in the milieu and participated very diligently in individual milieu and group therapy.  She was very focused on her well-being and described improvement in her mood.  Her lithium level was checked on 02/05/2017 and was noted to be 0.5 on account of which the dose of her lithium was increased to 450 mg twice daily.  She was maintained on this dose until she was discharged from the hospital.  On admission, urine toxicology screen was negative.  Her BMP and hemogram were all within normal limits.      DISCHARGE MEDICATIONS:   1.  Latuda 40 mg p.o. daily with dinner.    2.  Lithium carbonate  mg p.o. b.i.d.   3.  Apri 1 p.o. daily.     4.  Elavil 25 mg each night at bedtime.     5.  Nicotine polacrilex 4 mg every hour for smoking cessation.      DISPOSITION:  The patient was discharged from the hospital to follow up with her therapist at [facility], Zunilda Hernandez on Friday, 02/10/2017 at 10:00 a.m. and Monday, 02/13/2017, at 11:00 a.m.  She was also given an appointment to follow up with SUZANNE Dumont on Friday, 02/24/2017 at 4:00 p.m. at [facility] in  [place].      TIME SPENT:  40 minutes with more than 50% of the time spent in counseling, care coordination and discharge planning.         PAIGE ALEMAN MD             D: 2017 21:23   T: 2017 00:24   MT:       Name:     SHUN OWENS   MRN:      [id number]        Account:        [id number]   :      1975           Admit Date:     [id number]                                  Discharge Date: 2017      Document: [id number]

## 2017-02-24 ENCOUNTER — OFFICE VISIT - HEALTHEAST (OUTPATIENT)
Dept: FAMILY MEDICINE | Facility: CLINIC | Age: 42
End: 2017-02-24

## 2017-02-24 DIAGNOSIS — F31.9 BIPOLAR 1 DISORDER (H): ICD-10-CM

## 2017-02-24 ASSESSMENT — MIFFLIN-ST. JEOR: SCORE: 1504.53

## 2017-06-05 ENCOUNTER — COMMUNICATION - HEALTHEAST (OUTPATIENT)
Dept: SCHEDULING | Facility: CLINIC | Age: 42
End: 2017-06-05

## 2017-06-10 ENCOUNTER — COMMUNICATION - HEALTHEAST (OUTPATIENT)
Dept: FAMILY MEDICINE | Facility: CLINIC | Age: 42
End: 2017-06-10

## 2017-07-06 ENCOUNTER — RECORDS - HEALTHEAST (OUTPATIENT)
Dept: ADMINISTRATIVE | Facility: OTHER | Age: 42
End: 2017-07-06

## 2018-01-20 ENCOUNTER — HEALTH MAINTENANCE LETTER (OUTPATIENT)
Age: 43
End: 2018-01-20

## 2021-05-30 VITALS — BODY MASS INDEX: 26.13 KG/M2 | WEIGHT: 176.4 LBS | HEIGHT: 69 IN
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
## 2021-07-03 NOTE — ADDENDUM NOTE
Addendum Note by Arcelia Mariee CNP at 1/30/2017 12:23 PM     Author: Arcelia Mariee CNP Service: -- Author Type: Nurse Practitioner    Filed: 1/30/2017 12:23 PM Encounter Date: 1/27/2017 Status: Signed    : Arcelia Mariee CNP (Nurse Practitioner)    Addended by: ARCELIA MARIEE on: 1/30/2017 12:23 PM        Modules accepted: Orders